# Patient Record
Sex: FEMALE | Race: WHITE | Employment: UNEMPLOYED | URBAN - METROPOLITAN AREA
[De-identification: names, ages, dates, MRNs, and addresses within clinical notes are randomized per-mention and may not be internally consistent; named-entity substitution may affect disease eponyms.]

---

## 2024-04-16 ENCOUNTER — APPOINTMENT (OUTPATIENT)
Dept: GENERAL RADIOLOGY | Age: 89
DRG: 880 | End: 2024-04-16
Payer: MEDICARE

## 2024-04-16 ENCOUNTER — HOSPITAL ENCOUNTER (INPATIENT)
Age: 89
LOS: 1 days | Discharge: NURSING, REHAB OR LONG-TERM CARE | DRG: 880 | End: 2024-04-18
Attending: EMERGENCY MEDICINE | Admitting: STUDENT IN AN ORGANIZED HEALTH CARE EDUCATION/TRAINING PROGRAM
Payer: MEDICARE

## 2024-04-16 DIAGNOSIS — R06.02 SHORTNESS OF BREATH: Primary | ICD-10-CM

## 2024-04-16 PROBLEM — R07.9 CHEST PAIN: Status: ACTIVE | Noted: 2024-04-16

## 2024-04-16 LAB
ALBUMIN SERPL-MCNC: 4 G/DL (ref 3.4–5)
ALBUMIN/GLOB SERPL: 1.7 {RATIO} (ref 1.1–2.2)
ALP SERPL-CCNC: 79 U/L (ref 40–129)
ALT SERPL-CCNC: <5 U/L (ref 10–40)
ANION GAP SERPL CALCULATED.3IONS-SCNC: 9 MMOL/L (ref 3–16)
AST SERPL-CCNC: 15 U/L (ref 15–37)
BACTERIA URNS QL MICRO: ABNORMAL /HPF
BASOPHILS # BLD: 0 K/UL (ref 0–0.2)
BASOPHILS NFR BLD: 0.5 %
BILIRUB SERPL-MCNC: 1.3 MG/DL (ref 0–1)
BILIRUB UR QL STRIP.AUTO: NEGATIVE
BUN SERPL-MCNC: 11 MG/DL (ref 7–20)
CALCIUM SERPL-MCNC: 9.5 MG/DL (ref 8.3–10.6)
CHLORIDE SERPL-SCNC: 104 MMOL/L (ref 99–110)
CLARITY UR: CLEAR
CO2 SERPL-SCNC: 28 MMOL/L (ref 21–32)
COLOR UR: YELLOW
CREAT SERPL-MCNC: <0.5 MG/DL (ref 0.6–1.2)
D DIMER: <0.27 UG/ML FEU (ref 0–0.6)
DEPRECATED RDW RBC AUTO: 12.7 % (ref 12.4–15.4)
EKG ATRIAL RATE: 69 BPM
EKG DIAGNOSIS: NORMAL
EKG P AXIS: 27 DEGREES
EKG P-R INTERVAL: 188 MS
EKG Q-T INTERVAL: 408 MS
EKG QRS DURATION: 72 MS
EKG QTC CALCULATION (BAZETT): 437 MS
EKG R AXIS: -30 DEGREES
EKG T AXIS: 8 DEGREES
EKG VENTRICULAR RATE: 69 BPM
EOSINOPHIL # BLD: 0.3 K/UL (ref 0–0.6)
EOSINOPHIL NFR BLD: 3 %
GFR SERPLBLD CREATININE-BSD FMLA CKD-EPI: 90 ML/MIN/{1.73_M2}
GLUCOSE SERPL-MCNC: 110 MG/DL (ref 70–99)
GLUCOSE UR STRIP.AUTO-MCNC: NEGATIVE MG/DL
HCT VFR BLD AUTO: 36.5 % (ref 36–48)
HGB BLD-MCNC: 12.5 G/DL (ref 12–16)
HGB UR QL STRIP.AUTO: ABNORMAL
KETONES UR STRIP.AUTO-MCNC: NEGATIVE MG/DL
LEUKOCYTE ESTERASE UR QL STRIP.AUTO: ABNORMAL
LYMPHOCYTES # BLD: 1.4 K/UL (ref 1–5.1)
LYMPHOCYTES NFR BLD: 13.4 %
MCH RBC QN AUTO: 32 PG (ref 26–34)
MCHC RBC AUTO-ENTMCNC: 34.3 G/DL (ref 31–36)
MCV RBC AUTO: 93.4 FL (ref 80–100)
MONOCYTES # BLD: 0.6 K/UL (ref 0–1.3)
MONOCYTES NFR BLD: 5.9 %
NEUTROPHILS # BLD: 8 K/UL (ref 1.7–7.7)
NEUTROPHILS NFR BLD: 77.2 %
NITRITE UR QL STRIP.AUTO: POSITIVE
NT-PROBNP SERPL-MCNC: 180 PG/ML (ref 0–449)
PH UR STRIP.AUTO: 6.5 [PH] (ref 5–8)
PLATELET # BLD AUTO: 177 K/UL (ref 135–450)
PMV BLD AUTO: 9.8 FL (ref 5–10.5)
POTASSIUM SERPL-SCNC: 4.7 MMOL/L (ref 3.5–5.1)
PROT SERPL-MCNC: 6.3 G/DL (ref 6.4–8.2)
PROT UR STRIP.AUTO-MCNC: NEGATIVE MG/DL
RBC # BLD AUTO: 3.91 M/UL (ref 4–5.2)
RBC #/AREA URNS HPF: ABNORMAL /HPF (ref 0–4)
SODIUM SERPL-SCNC: 141 MMOL/L (ref 136–145)
SP GR UR STRIP.AUTO: 1.01 (ref 1–1.03)
TROPONIN, HIGH SENSITIVITY: 16 NG/L (ref 0–14)
TROPONIN, HIGH SENSITIVITY: 17 NG/L (ref 0–14)
TROPONIN, HIGH SENSITIVITY: 17 NG/L (ref 0–14)
TROPONIN, HIGH SENSITIVITY: 18 NG/L (ref 0–14)
UA COMPLETE W REFLEX CULTURE PNL UR: YES
UA DIPSTICK W REFLEX MICRO PNL UR: YES
URN SPEC COLLECT METH UR: ABNORMAL
UROBILINOGEN UR STRIP-ACNC: 0.2 E.U./DL
WBC # BLD AUTO: 10.3 K/UL (ref 4–11)
WBC #/AREA URNS HPF: ABNORMAL /HPF (ref 0–5)

## 2024-04-16 PROCEDURE — G0378 HOSPITAL OBSERVATION PER HR: HCPCS

## 2024-04-16 PROCEDURE — 83880 ASSAY OF NATRIURETIC PEPTIDE: CPT

## 2024-04-16 PROCEDURE — 6360000002 HC RX W HCPCS

## 2024-04-16 PROCEDURE — 85025 COMPLETE CBC W/AUTO DIFF WBC: CPT

## 2024-04-16 PROCEDURE — 96375 TX/PRO/DX INJ NEW DRUG ADDON: CPT

## 2024-04-16 PROCEDURE — 87186 SC STD MICRODIL/AGAR DIL: CPT

## 2024-04-16 PROCEDURE — 81001 URINALYSIS AUTO W/SCOPE: CPT

## 2024-04-16 PROCEDURE — 93010 ELECTROCARDIOGRAM REPORT: CPT | Performed by: INTERNAL MEDICINE

## 2024-04-16 PROCEDURE — 85379 FIBRIN DEGRADATION QUANT: CPT

## 2024-04-16 PROCEDURE — 84484 ASSAY OF TROPONIN QUANT: CPT

## 2024-04-16 PROCEDURE — 71045 X-RAY EXAM CHEST 1 VIEW: CPT

## 2024-04-16 PROCEDURE — 6370000000 HC RX 637 (ALT 250 FOR IP): Performed by: INTERNAL MEDICINE

## 2024-04-16 PROCEDURE — 80053 COMPREHEN METABOLIC PANEL: CPT

## 2024-04-16 PROCEDURE — 2580000003 HC RX 258: Performed by: INTERNAL MEDICINE

## 2024-04-16 PROCEDURE — 99285 EMERGENCY DEPT VISIT HI MDM: CPT

## 2024-04-16 PROCEDURE — 96372 THER/PROPH/DIAG INJ SC/IM: CPT

## 2024-04-16 PROCEDURE — 6360000002 HC RX W HCPCS: Performed by: EMERGENCY MEDICINE

## 2024-04-16 PROCEDURE — 6360000002 HC RX W HCPCS: Performed by: INTERNAL MEDICINE

## 2024-04-16 PROCEDURE — 87077 CULTURE AEROBIC IDENTIFY: CPT

## 2024-04-16 PROCEDURE — 96374 THER/PROPH/DIAG INJ IV PUSH: CPT

## 2024-04-16 PROCEDURE — 93005 ELECTROCARDIOGRAM TRACING: CPT | Performed by: EMERGENCY MEDICINE

## 2024-04-16 PROCEDURE — 6370000000 HC RX 637 (ALT 250 FOR IP): Performed by: EMERGENCY MEDICINE

## 2024-04-16 PROCEDURE — 36415 COLL VENOUS BLD VENIPUNCTURE: CPT

## 2024-04-16 PROCEDURE — 87086 URINE CULTURE/COLONY COUNT: CPT

## 2024-04-16 RX ORDER — MECLIZINE HYDROCHLORIDE 25 MG/1
25 TABLET ORAL 3 TIMES DAILY PRN
Status: DISCONTINUED | OUTPATIENT
Start: 2024-04-16 | End: 2024-04-18 | Stop reason: HOSPADM

## 2024-04-16 RX ORDER — POTASSIUM CHLORIDE 20 MEQ/1
40 TABLET, EXTENDED RELEASE ORAL PRN
Status: DISCONTINUED | OUTPATIENT
Start: 2024-04-16 | End: 2024-04-18 | Stop reason: HOSPADM

## 2024-04-16 RX ORDER — MIDODRINE HYDROCHLORIDE 10 MG/1
10 TABLET ORAL 3 TIMES DAILY
COMMUNITY

## 2024-04-16 RX ORDER — CALCIUM CARBONATE 500 MG/1
2 TABLET, CHEWABLE ORAL 3 TIMES DAILY PRN
COMMUNITY

## 2024-04-16 RX ORDER — FOLIC ACID 1 MG/1
1 TABLET ORAL DAILY
COMMUNITY
Start: 2023-08-16

## 2024-04-16 RX ORDER — ALPRAZOLAM 0.25 MG/1
0.25 TABLET ORAL 3 TIMES DAILY
Status: DISCONTINUED | OUTPATIENT
Start: 2024-04-16 | End: 2024-04-17

## 2024-04-16 RX ORDER — ACETAMINOPHEN 325 MG/1
650 TABLET ORAL EVERY 6 HOURS PRN
Status: DISCONTINUED | OUTPATIENT
Start: 2024-04-16 | End: 2024-04-18 | Stop reason: HOSPADM

## 2024-04-16 RX ORDER — ALPRAZOLAM 0.25 MG/1
0.12 TABLET ORAL 4 TIMES DAILY
COMMUNITY
Start: 2023-08-31

## 2024-04-16 RX ORDER — MIDODRINE HYDROCHLORIDE 5 MG/1
5 TABLET ORAL 3 TIMES DAILY
Status: DISCONTINUED | OUTPATIENT
Start: 2024-04-16 | End: 2024-04-18 | Stop reason: HOSPADM

## 2024-04-16 RX ORDER — HYDROXYZINE HYDROCHLORIDE 25 MG/1
25 TABLET, FILM COATED ORAL DAILY PRN
COMMUNITY

## 2024-04-16 RX ORDER — ONDANSETRON 4 MG/1
4 TABLET, FILM COATED ORAL EVERY 6 HOURS PRN
COMMUNITY

## 2024-04-16 RX ORDER — ONDANSETRON 2 MG/ML
4 INJECTION INTRAMUSCULAR; INTRAVENOUS ONCE
Status: COMPLETED | OUTPATIENT
Start: 2024-04-16 | End: 2024-04-16

## 2024-04-16 RX ORDER — SODIUM CHLORIDE 0.9 % (FLUSH) 0.9 %
5-40 SYRINGE (ML) INJECTION EVERY 12 HOURS SCHEDULED
Status: DISCONTINUED | OUTPATIENT
Start: 2024-04-16 | End: 2024-04-18 | Stop reason: HOSPADM

## 2024-04-16 RX ORDER — ATORVASTATIN CALCIUM 40 MG/1
40 TABLET, FILM COATED ORAL NIGHTLY
Status: DISCONTINUED | OUTPATIENT
Start: 2024-04-16 | End: 2024-04-16 | Stop reason: SDUPTHER

## 2024-04-16 RX ORDER — MAGNESIUM SULFATE IN WATER 40 MG/ML
2000 INJECTION, SOLUTION INTRAVENOUS PRN
Status: DISCONTINUED | OUTPATIENT
Start: 2024-04-16 | End: 2024-04-18 | Stop reason: HOSPADM

## 2024-04-16 RX ORDER — NITROGLYCERIN 0.4 MG/1
0.4 TABLET SUBLINGUAL EVERY 5 MIN PRN
Status: DISCONTINUED | OUTPATIENT
Start: 2024-04-16 | End: 2024-04-18 | Stop reason: HOSPADM

## 2024-04-16 RX ORDER — POLYETHYLENE GLYCOL 3350 17 G/17G
17 POWDER, FOR SOLUTION ORAL DAILY PRN
Status: DISCONTINUED | OUTPATIENT
Start: 2024-04-16 | End: 2024-04-18 | Stop reason: HOSPADM

## 2024-04-16 RX ORDER — ONDANSETRON 4 MG/1
4 TABLET, ORALLY DISINTEGRATING ORAL EVERY 8 HOURS PRN
Status: DISCONTINUED | OUTPATIENT
Start: 2024-04-16 | End: 2024-04-18 | Stop reason: HOSPADM

## 2024-04-16 RX ORDER — ASPIRIN 81 MG/1
324 TABLET, CHEWABLE ORAL ONCE
Status: COMPLETED | OUTPATIENT
Start: 2024-04-16 | End: 2024-04-16

## 2024-04-16 RX ORDER — AMOXICILLIN 250 MG
1 CAPSULE ORAL 2 TIMES DAILY
COMMUNITY

## 2024-04-16 RX ORDER — LEVOTHYROXINE SODIUM 0.1 MG/1
100 TABLET ORAL DAILY
COMMUNITY

## 2024-04-16 RX ORDER — ASPIRIN 81 MG/1
81 TABLET, CHEWABLE ORAL DAILY
Status: DISCONTINUED | OUTPATIENT
Start: 2024-04-17 | End: 2024-04-18 | Stop reason: HOSPADM

## 2024-04-16 RX ORDER — FUROSEMIDE 10 MG/ML
20 INJECTION INTRAMUSCULAR; INTRAVENOUS ONCE
Status: COMPLETED | OUTPATIENT
Start: 2024-04-16 | End: 2024-04-16

## 2024-04-16 RX ORDER — TRAZODONE HYDROCHLORIDE 50 MG/1
50 TABLET ORAL NIGHTLY
COMMUNITY
Start: 2024-02-23

## 2024-04-16 RX ORDER — LEVOTHYROXINE SODIUM 0.1 MG/1
100 TABLET ORAL DAILY
Status: DISCONTINUED | OUTPATIENT
Start: 2024-04-16 | End: 2024-04-18 | Stop reason: HOSPADM

## 2024-04-16 RX ORDER — ENOXAPARIN SODIUM 100 MG/ML
40 INJECTION SUBCUTANEOUS DAILY
Status: DISCONTINUED | OUTPATIENT
Start: 2024-04-16 | End: 2024-04-18 | Stop reason: HOSPADM

## 2024-04-16 RX ORDER — MECLIZINE HYDROCHLORIDE 25 MG/1
25 TABLET ORAL 3 TIMES DAILY PRN
COMMUNITY
Start: 2024-01-26

## 2024-04-16 RX ORDER — ONDANSETRON 2 MG/ML
4 INJECTION INTRAMUSCULAR; INTRAVENOUS EVERY 6 HOURS PRN
Status: DISCONTINUED | OUTPATIENT
Start: 2024-04-16 | End: 2024-04-18 | Stop reason: HOSPADM

## 2024-04-16 RX ORDER — POLYETHYLENE GLYCOL 3350 17 G/17G
17 POWDER, FOR SOLUTION ORAL DAILY
COMMUNITY

## 2024-04-16 RX ORDER — POTASSIUM CHLORIDE 7.45 MG/ML
10 INJECTION INTRAVENOUS PRN
Status: DISCONTINUED | OUTPATIENT
Start: 2024-04-16 | End: 2024-04-18 | Stop reason: HOSPADM

## 2024-04-16 RX ORDER — ROSUVASTATIN CALCIUM 20 MG/1
20 TABLET, COATED ORAL DAILY
COMMUNITY
Start: 2022-12-08

## 2024-04-16 RX ORDER — CITALOPRAM HYDROBROMIDE 10 MG/1
10 TABLET ORAL DAILY
COMMUNITY

## 2024-04-16 RX ORDER — REGADENOSON 0.08 MG/ML
0.4 INJECTION, SOLUTION INTRAVENOUS
Status: DISCONTINUED | OUTPATIENT
Start: 2024-04-16 | End: 2024-04-18 | Stop reason: HOSPADM

## 2024-04-16 RX ORDER — ROSUVASTATIN CALCIUM 20 MG/1
20 TABLET, COATED ORAL DAILY
Status: DISCONTINUED | OUTPATIENT
Start: 2024-04-16 | End: 2024-04-18 | Stop reason: HOSPADM

## 2024-04-16 RX ORDER — ALPRAZOLAM 0.5 MG/1
0.25 TABLET ORAL ONCE
Status: COMPLETED | OUTPATIENT
Start: 2024-04-16 | End: 2024-04-16

## 2024-04-16 RX ORDER — SODIUM CHLORIDE 9 MG/ML
INJECTION, SOLUTION INTRAVENOUS PRN
Status: DISCONTINUED | OUTPATIENT
Start: 2024-04-16 | End: 2024-04-18 | Stop reason: HOSPADM

## 2024-04-16 RX ORDER — MINERAL OIL/HYDROPHIL PETROLAT
OINTMENT (GRAM) TOPICAL PRN
COMMUNITY

## 2024-04-16 RX ORDER — ACETAMINOPHEN 650 MG/1
650 SUPPOSITORY RECTAL EVERY 6 HOURS PRN
Status: DISCONTINUED | OUTPATIENT
Start: 2024-04-16 | End: 2024-04-18 | Stop reason: HOSPADM

## 2024-04-16 RX ORDER — TRAZODONE HYDROCHLORIDE 100 MG/1
100 TABLET ORAL NIGHTLY
Status: DISCONTINUED | OUTPATIENT
Start: 2024-04-16 | End: 2024-04-17

## 2024-04-16 RX ORDER — SODIUM CHLORIDE 0.9 % (FLUSH) 0.9 %
5-40 SYRINGE (ML) INJECTION PRN
Status: DISCONTINUED | OUTPATIENT
Start: 2024-04-16 | End: 2024-04-18 | Stop reason: HOSPADM

## 2024-04-16 RX ORDER — DESVENLAFAXINE 25 MG/1
25 TABLET, EXTENDED RELEASE ORAL DAILY
COMMUNITY
End: 2024-04-25

## 2024-04-16 RX ORDER — ASPIRIN 81 MG/1
81 TABLET ORAL DAILY
Status: DISCONTINUED | OUTPATIENT
Start: 2024-04-17 | End: 2024-04-16 | Stop reason: SDUPTHER

## 2024-04-16 RX ADMIN — ACETAMINOPHEN 650 MG: 325 TABLET ORAL at 23:06

## 2024-04-16 RX ADMIN — LEVOTHYROXINE SODIUM 100 MCG: 0.1 TABLET ORAL at 13:23

## 2024-04-16 RX ADMIN — ALPRAZOLAM 0.25 MG: 0.25 TABLET ORAL at 23:01

## 2024-04-16 RX ADMIN — TRAZODONE HYDROCHLORIDE 100 MG: 100 TABLET ORAL at 23:01

## 2024-04-16 RX ADMIN — MIDODRINE HYDROCHLORIDE 5 MG: 5 TABLET ORAL at 13:23

## 2024-04-16 RX ADMIN — ROSUVASTATIN CALCIUM 20 MG: 20 TABLET, COATED ORAL at 13:23

## 2024-04-16 RX ADMIN — SODIUM CHLORIDE, PRESERVATIVE FREE 10 ML: 5 INJECTION INTRAVENOUS at 23:02

## 2024-04-16 RX ADMIN — CARBIDOPA AND LEVODOPA 1.5 TABLET: 25; 100 TABLET ORAL at 17:24

## 2024-04-16 RX ADMIN — FUROSEMIDE 20 MG: 10 INJECTION, SOLUTION INTRAMUSCULAR; INTRAVENOUS at 13:24

## 2024-04-16 RX ADMIN — ALPRAZOLAM 0.25 MG: 0.5 TABLET ORAL at 05:00

## 2024-04-16 RX ADMIN — SODIUM CHLORIDE, PRESERVATIVE FREE 10 ML: 5 INJECTION INTRAVENOUS at 11:20

## 2024-04-16 RX ADMIN — ENOXAPARIN SODIUM 40 MG: 100 INJECTION SUBCUTANEOUS at 13:24

## 2024-04-16 RX ADMIN — MECLIZINE HYDROCHLORIDE 25 MG: 25 TABLET ORAL at 23:06

## 2024-04-16 RX ADMIN — ONDANSETRON 4 MG: 2 INJECTION INTRAMUSCULAR; INTRAVENOUS at 05:00

## 2024-04-16 RX ADMIN — MIDODRINE HYDROCHLORIDE 5 MG: 5 TABLET ORAL at 17:24

## 2024-04-16 RX ADMIN — CARBIDOPA AND LEVODOPA 1.5 TABLET: 25; 100 TABLET ORAL at 23:01

## 2024-04-16 RX ADMIN — ALPRAZOLAM 0.25 MG: 0.25 TABLET ORAL at 13:23

## 2024-04-16 RX ADMIN — ASPIRIN 324 MG: 81 TABLET, CHEWABLE ORAL at 08:37

## 2024-04-16 RX ADMIN — CARBIDOPA AND LEVODOPA 1.5 TABLET: 25; 100 TABLET ORAL at 13:23

## 2024-04-16 ASSESSMENT — HEART SCORE: ECG: NORMAL

## 2024-04-16 ASSESSMENT — ENCOUNTER SYMPTOMS
DIARRHEA: 0
VOMITING: 0
TROUBLE SWALLOWING: 0
SORE THROAT: 0
COUGH: 0
RHINORRHEA: 0
CHEST TIGHTNESS: 1
VOICE CHANGE: 0
ABDOMINAL PAIN: 0
NAUSEA: 0
EYES NEGATIVE: 1
GASTROINTESTINAL NEGATIVE: 1
CONSTIPATION: 0
SHORTNESS OF BREATH: 1
WHEEZING: 0

## 2024-04-16 ASSESSMENT — PAIN SCALES - GENERAL
PAINLEVEL_OUTOF10: 0
PAINLEVEL_OUTOF10: 3

## 2024-04-16 ASSESSMENT — PAIN DESCRIPTION - PAIN TYPE: TYPE: ACUTE PAIN

## 2024-04-16 ASSESSMENT — PAIN DESCRIPTION - LOCATION: LOCATION: THROAT

## 2024-04-16 ASSESSMENT — PAIN DESCRIPTION - FREQUENCY: FREQUENCY: CONTINUOUS

## 2024-04-16 ASSESSMENT — PAIN - FUNCTIONAL ASSESSMENT
PAIN_FUNCTIONAL_ASSESSMENT: PREVENTS OR INTERFERES SOME ACTIVE ACTIVITIES AND ADLS
PAIN_FUNCTIONAL_ASSESSMENT: NONE - DENIES PAIN

## 2024-04-16 ASSESSMENT — PAIN DESCRIPTION - DESCRIPTORS: DESCRIPTORS: SORE

## 2024-04-16 ASSESSMENT — PAIN DESCRIPTION - ONSET: ONSET: ON-GOING

## 2024-04-16 NOTE — ED NOTES
Report called to Zay at [facility] patient transferring to room #6316, [facility] eta 30 min.  Patient updated on transfer, depends changed patient requesting wick for transfer.

## 2024-04-16 NOTE — PROGRESS NOTES
Spoke to staff nurse Connie at [facility] in regards to pt medications, nurse stated she would fax over pt med list.  Awaiting fax. Electronically signed by Rosa Acevedo RN on 4/16/2024 at 5:41 PM

## 2024-04-16 NOTE — H&P
Internal Medicine  PGY 2  History & Physical      CC : Shortness of breath    History Obtained From:  patient    HISTORY OF PRESENT ILLNESS:    Mrs. Givens is a 88-year-old female with past medical history of anxiety, CAD, depression, essential tremor, hypothyroidism, and Parkinson's who presented to the emergency department today due to shortness of breath.  She states she woke up this morning around 2 AM from sleeping and felt short of breath.  Around 3 AM she decided to call EMS because she was still short of breath.  She states that at home about 80% of the time she wears 2 L of oxygen.  She cannot give me a reason of why she wears oxygen other than it makes her feel better.  She states that she has had increased swelling in both of her legs over the past month.  She states that she does not feel any better since this morning.  She denies any fever, chills, nausea, vomiting, abdominal pain, diarrhea, or chest pain.    In the emergency department her EKG showed normal sinus rhythm with no signs of ischemia.  Chest x-ray showed no evidence of CHF or pneumonia.  CBC and BMP were within normal limits.  High-sensitivity troponin slightly elevated but stable on repeat.  BMP was normal.  D-dimer is negative she was not tachycardic.  She was given a dose of 0.25 Xanax in the emergency department and felt better.    Past Medical History:        Diagnosis Date    CAD (coronary artery disease)     Cancer (HCC) skin    Hyperlipidemia     Parkinson's disease (HCC)     Thyroid disease        Past Surgical History:        Procedure Laterality Date    COLONOSCOPY  8/11    HYSTERECTOMY (CERVIX STATUS UNKNOWN)      SKIN CANCER EXCISION      TONSILLECTOMY         Medications Priorto Admission:    Medications Prior to Admission: carbidopa-levodopa (SINEMET)  MG per tablet, 1.5 tabs  every 3 hours, 5 x/day (8a, 11a, 2p, 5p, 8p).  folic acid (FOLVITE) 1 MG tablet, Take 1 tablet by mouth daily  meclizine (ANTIVERT) 25 MG tablet,

## 2024-04-16 NOTE — ED PROVIDER NOTES
[facility]  EMERGENCY DEPT VISIT      Patient Identification  Helen Givens is a 88 y.o. female.    Chief Complaint   Shortness of Breath      History of Present Illness:    History was obtained from patient.  Limitations to history: none  This is a  88 y.o. female who presents via ambulance to the ED with complaints of shortness of breath that woke her from sleep. Patient wears oxygen nasal canula but is not able to tell me why she need oxygen. She states she was having some palpitations when she woke up with the shortness of breath.  She denies any chest pain. Very faint tightness.  She has done this frequently in the past and typically has improvement if she takes her Xanax. This time shortness of breath was more severe than usual.  She states that currently she is taking her Xanax 4 times daily and they wake her up at 5 AM to give her meds.  She denies any fever.  No sinus congestion, sore throat, cough.  She does report leg swelling for the last 3 months.  No history of congestive heart failure but states that she sits a lot.  No history of DVT or PE.  Patient is also complaining of dizziness.  She has had this off and on in the past and she takes an uncertain medication for it.  The spinning is no different from what she has had previously.  She does feel nauseated but has had no vomiting.  Patient states that she is currently residing in a nursing facility and that she was told her vitals look good despite her shortness of breath.  Per report from the nursing home the patient called 911 herself..     Past Medical History:   Diagnosis Date    CAD (coronary artery disease)     Cancer (HCC) skin    Hyperlipidemia     Parkinson's disease (HCC)     Thyroid disease        Past Surgical History:   Procedure Laterality Date    COLONOSCOPY  8/11    HYSTERECTOMY (CERVIX STATUS UNKNOWN)      SKIN CANCER EXCISION      TONSILLECTOMY           Current Facility-Administered Medications:     aspirin chewable tablet 324 mg, 324  177 135 - 450 K/uL    MPV 9.8 5.0 - 10.5 fL    Neutrophils % 77.2 %    Lymphocytes % 13.4 %    Monocytes % 5.9 %    Eosinophils % 3.0 %    Basophils % 0.5 %    Neutrophils Absolute 8.0 (H) 1.7 - 7.7 K/uL    Lymphocytes Absolute 1.4 1.0 - 5.1 K/uL    Monocytes Absolute 0.6 0.0 - 1.3 K/uL    Eosinophils Absolute 0.3 0.0 - 0.6 K/uL    Basophils Absolute 0.0 0.0 - 0.2 K/uL   CMP w/ Reflex to MG   Result Value Ref Range    Sodium 141 136 - 145 mmol/L    Potassium reflex Magnesium 4.7 3.5 - 5.1 mmol/L    Chloride 104 99 - 110 mmol/L    CO2 28 21 - 32 mmol/L    Anion Gap 9 3 - 16    Glucose 110 (H) 70 - 99 mg/dL    BUN 11 7 - 20 mg/dL    Creatinine <0.5 (L) 0.6 - 1.2 mg/dL    Est, Glom Filt Rate 90 >60    Calcium 9.5 8.3 - 10.6 mg/dL    Total Protein 6.3 (L) 6.4 - 8.2 g/dL    Albumin 4.0 3.4 - 5.0 g/dL    Albumin/Globulin Ratio 1.7 1.1 - 2.2    Total Bilirubin 1.3 (H) 0.0 - 1.0 mg/dL    Alkaline Phosphatase 79 40 - 129 U/L    ALT <5 (L) 10 - 40 U/L    AST 15 15 - 37 U/L   D-Dimer, Quantitative   Result Value Ref Range    D-Dimer, Quant <0.27 0.00 - 0.60 ug/mL FEU   BNP   Result Value Ref Range    Pro- 0 - 449 pg/mL   Troponin   Result Value Ref Range    Troponin, High Sensitivity 17 (H) 0 - 14 ng/L   Troponin   Result Value Ref Range    Troponin, High Sensitivity 18 (H) 0 - 14 ng/L   EKG 12 Lead   Result Value Ref Range    Ventricular Rate 69 BPM    Atrial Rate 69 BPM    P-R Interval 188 ms    QRS Duration 72 ms    Q-T Interval 408 ms    QTc Calculation (Bazett) 437 ms    P Axis 27 degrees    R Axis -30 degrees    T Axis 8 degrees    Diagnosis Normal sinus rhythmLeft axis deviationAbnormal ECG        Treatment in the department:  Patient received the following while in the ED.    Medications   aspirin chewable tablet 324 mg (has no administration in time range)   ALPRAZolam (XANAX) tablet 0.25 mg (0.25 mg Oral Given 4/16/24 0500)   ondansetron (ZOFRAN) injection 4 mg (4 mg IntraVENous Given 4/16/24 0500)

## 2024-04-16 NOTE — ED TRIAGE NOTES
Pt to ED from SNF via EMS for shortness of breath for the past hour. Pt wears 3L O2 at baseline. Pt awake and alert.

## 2024-04-16 NOTE — PROGRESS NOTES
Pt arrived to room 6319, AO x4 VSS. Pt on 3L of O2 at baseline. Pt in bed, alarm on locked and low. Call light within reach.  Electronically signed by Rosa Acevedo RN on 4/16/2024 at 10:40 AM

## 2024-04-16 NOTE — PROGRESS NOTES
4 Eyes Skin Assessment     NAME:  Helen Givens  YOB: 1935  MEDICAL RECORD NUMBER:  [id number]    The patient is being assessed for  Admission    I agree that at least one RN has performed a thorough Head to Toe Skin Assessment on the patient. ALL assessment sites listed below have been assessed.      Areas assessed by both nurses:    Head, Face, Ears, Shoulders, Back, Chest, Arms, Elbows, Hands, Sacrum. Buttock, Coccyx, Ischium, Legs. Feet and Heels, and Under Medical Devices         Does the Patient have a Wound? No noted wound(s)       Dk Prevention initiated by RN: No  Wound Care Orders initiated by RN: No    Pressure Injury (Stage 3,4, Unstageable, DTI, NWPT, and Complex wounds) if present, place Wound referral order by RN under : No    New Ostomies, if present place, Ostomy referral order under : No     Nurse 1 eSignature: Electronically signed by Rosa Acevedo RN on 4/16/24 at 10:41 AM EDT    **SHARE this note so that the co-signing nurse can place an eSignature**    Nurse 2 eSignature: Electronically signed by Bernardo Guidry RN on 4/16/24 at 10:52 AM EDT

## 2024-04-17 PROBLEM — F41.9 ANXIETY: Status: ACTIVE | Noted: 2024-04-17

## 2024-04-17 PROBLEM — F33.1 MODERATE EPISODE OF RECURRENT MAJOR DEPRESSIVE DISORDER (HCC): Status: ACTIVE | Noted: 2024-04-17

## 2024-04-17 LAB
25(OH)D3 SERPL-MCNC: 18.2 NG/ML
ANION GAP SERPL CALCULATED.3IONS-SCNC: 10 MMOL/L (ref 3–16)
BUN SERPL-MCNC: 12 MG/DL (ref 7–20)
CALCIUM SERPL-MCNC: 9.1 MG/DL (ref 8.3–10.6)
CHLORIDE SERPL-SCNC: 97 MMOL/L (ref 99–110)
CO2 SERPL-SCNC: 28 MMOL/L (ref 21–32)
CREAT SERPL-MCNC: 0.8 MG/DL (ref 0.6–1.2)
DEPRECATED RDW RBC AUTO: 12.6 % (ref 12.4–15.4)
EKG ATRIAL RATE: 62 BPM
EKG DIAGNOSIS: NORMAL
EKG P AXIS: 29 DEGREES
EKG P-R INTERVAL: 188 MS
EKG Q-T INTERVAL: 432 MS
EKG QRS DURATION: 82 MS
EKG QTC CALCULATION (BAZETT): 438 MS
EKG R AXIS: -22 DEGREES
EKG T AXIS: 15 DEGREES
EKG VENTRICULAR RATE: 62 BPM
FOLATE SERPL-MCNC: >20 NG/ML (ref 4.78–24.2)
GFR SERPLBLD CREATININE-BSD FMLA CKD-EPI: 71 ML/MIN/{1.73_M2}
GLUCOSE SERPL-MCNC: 90 MG/DL (ref 70–99)
HCT VFR BLD AUTO: 38.9 % (ref 36–48)
HGB BLD-MCNC: 12.8 G/DL (ref 12–16)
MCH RBC QN AUTO: 31 PG (ref 26–34)
MCHC RBC AUTO-ENTMCNC: 32.9 G/DL (ref 31–36)
MCV RBC AUTO: 94.3 FL (ref 80–100)
PLATELET # BLD AUTO: 141 K/UL (ref 135–450)
PMV BLD AUTO: 9.7 FL (ref 5–10.5)
POTASSIUM SERPL-SCNC: 3.9 MMOL/L (ref 3.5–5.1)
RBC # BLD AUTO: 4.13 M/UL (ref 4–5.2)
SODIUM SERPL-SCNC: 135 MMOL/L (ref 136–145)
TSH SERPL DL<=0.005 MIU/L-ACNC: 4.79 UIU/ML (ref 0.27–4.2)
VIT B12 SERPL-MCNC: 877 PG/ML (ref 211–911)
WBC # BLD AUTO: 11 K/UL (ref 4–11)

## 2024-04-17 PROCEDURE — 6370000000 HC RX 637 (ALT 250 FOR IP): Performed by: NURSE PRACTITIONER

## 2024-04-17 PROCEDURE — 6370000000 HC RX 637 (ALT 250 FOR IP): Performed by: STUDENT IN AN ORGANIZED HEALTH CARE EDUCATION/TRAINING PROGRAM

## 2024-04-17 PROCEDURE — 82746 ASSAY OF FOLIC ACID SERUM: CPT

## 2024-04-17 PROCEDURE — 93005 ELECTROCARDIOGRAM TRACING: CPT

## 2024-04-17 PROCEDURE — 6370000000 HC RX 637 (ALT 250 FOR IP): Performed by: INTERNAL MEDICINE

## 2024-04-17 PROCEDURE — 85027 COMPLETE CBC AUTOMATED: CPT

## 2024-04-17 PROCEDURE — G0378 HOSPITAL OBSERVATION PER HR: HCPCS

## 2024-04-17 PROCEDURE — 2580000003 HC RX 258

## 2024-04-17 PROCEDURE — 96375 TX/PRO/DX INJ NEW DRUG ADDON: CPT

## 2024-04-17 PROCEDURE — 6360000002 HC RX W HCPCS

## 2024-04-17 PROCEDURE — 93010 ELECTROCARDIOGRAM REPORT: CPT | Performed by: INTERNAL MEDICINE

## 2024-04-17 PROCEDURE — 82306 VITAMIN D 25 HYDROXY: CPT

## 2024-04-17 PROCEDURE — 84439 ASSAY OF FREE THYROXINE: CPT

## 2024-04-17 PROCEDURE — 99222 1ST HOSP IP/OBS MODERATE 55: CPT | Performed by: NURSE PRACTITIONER

## 2024-04-17 PROCEDURE — 6360000002 HC RX W HCPCS: Performed by: INTERNAL MEDICINE

## 2024-04-17 PROCEDURE — 36415 COLL VENOUS BLD VENIPUNCTURE: CPT

## 2024-04-17 PROCEDURE — 99223 1ST HOSP IP/OBS HIGH 75: CPT | Performed by: NURSE PRACTITIONER

## 2024-04-17 PROCEDURE — 80048 BASIC METABOLIC PNL TOTAL CA: CPT

## 2024-04-17 PROCEDURE — 82607 VITAMIN B-12: CPT

## 2024-04-17 PROCEDURE — 2580000003 HC RX 258: Performed by: INTERNAL MEDICINE

## 2024-04-17 PROCEDURE — 96372 THER/PROPH/DIAG INJ SC/IM: CPT

## 2024-04-17 PROCEDURE — 84443 ASSAY THYROID STIM HORMONE: CPT

## 2024-04-17 RX ORDER — VENLAFAXINE HYDROCHLORIDE 37.5 MG/1
37.5 CAPSULE, EXTENDED RELEASE ORAL DAILY
Status: DISCONTINUED | OUTPATIENT
Start: 2024-04-17 | End: 2024-04-18 | Stop reason: HOSPADM

## 2024-04-17 RX ORDER — CITALOPRAM HYDROBROMIDE 10 MG/1
10 TABLET ORAL DAILY
Status: DISCONTINUED | OUTPATIENT
Start: 2024-04-17 | End: 2024-04-18 | Stop reason: HOSPADM

## 2024-04-17 RX ORDER — CARBIDOPA AND LEVODOPA 25; 100 MG/1; MG/1
2 TABLET, EXTENDED RELEASE ORAL NIGHTLY
Status: DISCONTINUED | OUTPATIENT
Start: 2024-04-17 | End: 2024-04-18 | Stop reason: HOSPADM

## 2024-04-17 RX ORDER — CLONAZEPAM 0.5 MG/1
0.25 TABLET ORAL EVERY 12 HOURS
Status: DISCONTINUED | OUTPATIENT
Start: 2024-04-17 | End: 2024-04-18 | Stop reason: HOSPADM

## 2024-04-17 RX ORDER — POLYVINYL ALCOHOL 14 MG/ML
1 SOLUTION/ DROPS OPHTHALMIC PRN
Status: DISCONTINUED | OUTPATIENT
Start: 2024-04-17 | End: 2024-04-18 | Stop reason: HOSPADM

## 2024-04-17 RX ORDER — ALPRAZOLAM 0.25 MG/1
0.12 TABLET ORAL 4 TIMES DAILY
Status: DISCONTINUED | OUTPATIENT
Start: 2024-04-17 | End: 2024-04-17

## 2024-04-17 RX ORDER — CARBIDOPA AND LEVODOPA 25; 100 MG/1; MG/1
2 TABLET, EXTENDED RELEASE ORAL NIGHTLY
COMMUNITY

## 2024-04-17 RX ORDER — TRAZODONE HYDROCHLORIDE 50 MG/1
50 TABLET ORAL NIGHTLY
Status: DISCONTINUED | OUTPATIENT
Start: 2024-04-17 | End: 2024-04-18 | Stop reason: HOSPADM

## 2024-04-17 RX ADMIN — WATER 1000 MG: 1 INJECTION INTRAMUSCULAR; INTRAVENOUS; SUBCUTANEOUS at 12:09

## 2024-04-17 RX ADMIN — ALPRAZOLAM 0.12 MG: 0.25 TABLET ORAL at 09:17

## 2024-04-17 RX ADMIN — TRAZODONE HYDROCHLORIDE 50 MG: 50 TABLET ORAL at 21:24

## 2024-04-17 RX ADMIN — VENLAFAXINE HYDROCHLORIDE 37.5 MG: 37.5 CAPSULE, EXTENDED RELEASE ORAL at 09:24

## 2024-04-17 RX ADMIN — SODIUM CHLORIDE, PRESERVATIVE FREE 10 ML: 5 INJECTION INTRAVENOUS at 21:35

## 2024-04-17 RX ADMIN — CARBIDOPA AND LEVODOPA 1.5 TABLET: 25; 100 TABLET ORAL at 09:17

## 2024-04-17 RX ADMIN — ENOXAPARIN SODIUM 40 MG: 100 INJECTION SUBCUTANEOUS at 09:26

## 2024-04-17 RX ADMIN — CITALOPRAM HYDROBROMIDE 10 MG: 10 TABLET ORAL at 09:18

## 2024-04-17 RX ADMIN — CARBIDOPA AND LEVODOPA 1.5 TABLET: 25; 100 TABLET ORAL at 17:45

## 2024-04-17 RX ADMIN — LEVOTHYROXINE SODIUM 100 MCG: 0.1 TABLET ORAL at 09:18

## 2024-04-17 RX ADMIN — CARBIDOPA AND LEVODOPA 1.5 TABLET: 25; 100 TABLET ORAL at 12:06

## 2024-04-17 RX ADMIN — ROSUVASTATIN CALCIUM 20 MG: 20 TABLET, COATED ORAL at 09:18

## 2024-04-17 RX ADMIN — SODIUM CHLORIDE, PRESERVATIVE FREE 10 ML: 5 INJECTION INTRAVENOUS at 09:18

## 2024-04-17 RX ADMIN — ALPRAZOLAM 0.12 MG: 0.25 TABLET ORAL at 12:06

## 2024-04-17 RX ADMIN — CLONAZEPAM 0.25 MG: 0.5 TABLET ORAL at 18:41

## 2024-04-17 RX ADMIN — MIDODRINE HYDROCHLORIDE 5 MG: 5 TABLET ORAL at 09:24

## 2024-04-17 RX ADMIN — ASPIRIN 81 MG: 81 TABLET, CHEWABLE ORAL at 09:18

## 2024-04-17 RX ADMIN — MIDODRINE HYDROCHLORIDE 5 MG: 5 TABLET ORAL at 12:06

## 2024-04-17 RX ADMIN — CARBIDOPA AND LEVODOPA 1.5 TABLET: 25; 100 TABLET ORAL at 21:24

## 2024-04-17 ASSESSMENT — ENCOUNTER SYMPTOMS
GASTROINTESTINAL NEGATIVE: 1
SHORTNESS OF BREATH: 1

## 2024-04-17 NOTE — PLAN OF CARE
D: very anxious last pm. Was requesting for staff to hold her for comfort and to stay at bedside stating \"Don't leave me. Get someone to hold me.\" Allowed pt to verbalize concerns. Emotional support and reassurance given. C/o sore throat and some chest discomfort, tremors, and dizziness, but after receiving Trazodone 100mg, Xanax .25mg, Antivert, and Tylenol pt slept. Aroused easily and denied pain see pain assessment . Trazodone and Xanax dose while in hospital higher than what she was receiving at [facility]. Purulent pimple/boil noted R-labia and R-pannus.   A: Cont to monitor during hourly rounds    Problem: Safety - Adult  Goal: Free from fall injury  4/17/2024 0433 by Barbie Seals, RN  Outcome: Progressing  4/17/2024 0432 by Barbie Seals, RN  Outcome: Progressing     Problem: ABCDS Injury Assessment  Goal: Absence of physical injury  4/17/2024 0433 by Barbie Seals, RN  Outcome: Progressing  4/17/2024 0432 by Barbie Seals, RN  Outcome: Progressing     Problem: Pain  Goal: Verbalizes/displays adequate comfort level or baseline comfort level  Outcome: Progressing

## 2024-04-17 NOTE — CONSULTS
[facility]  Palliative Medicine Consultation Note      Date Of Admission:4/16/2024  Date of consult: 04/17/24  Seen by PC in the past:  No    Recommendations:        Met with pt and introduced palliative care. She is A&Ox3 but could not tell me why she's in the hospital. I explained it to her. She mainly responded by asking me to move her HOB up or down. I helped reposition her for comfort. She seemed anxious with her frequent requests for slight repositioning. She states her sister-in-law Irma is her HCPOA. I called Irma and introduced palliative care. Irma confirms she is pt's HCPOA but says she doesn't have a copy. She asked that we obtain a copy from [facility], where pt has been living for about 9 months. Irma reports that the pt has had significant anxiety for many years and tends to seek out medications to help her with anxiety and sleep. She has gone to the ED many times with complaints related to anxiety and sob, and Irma says her anxiety worsens at night. She often calls family and friends in the middle of the night saying she's dying. She has hired 24/7 caregivers through [employer] to stay with her at [facility]. Irma says the pt is \"sharp as a tack\" when it comes to her finances, stocks, taxes, etc but that she can get confused when anxious in the evening and night. I informed Irma about what we are currently treating her for and the plan of care. Left message for CM to attempt to obtain HCPOA from [facility] and discussed case with Alba Hernandez psych APRN.    1. Goals of Care/Advanced Care planning/Code status: DNR/DNI (Limited- no to all interventions). Pt hopes for relief of her anxiety and to maintain her status at [facility].  2. Pain: denies pain but c/o \"discomfort\" related to positioning in the bed and likely related to anxiety as well.  3. SOB: c/o sob this morning while sating ok on 2 L oxygen, which she uses at baseline. Difficult to assess breathing due  (FOLVITE) 1 MG tablet, Take 1 tablet by mouth daily  meclizine (ANTIVERT) 25 MG tablet, Take 1 tablet by mouth 3 times daily as needed  rosuvastatin (CRESTOR) 20 MG tablet, Take 1 tablet by mouth daily  traZODone (DESYREL) 50 MG tablet, Take 1 tablet by mouth nightly Give with 11pm Alprazolam  ALPRAZolam (XANAX) 0.25 MG tablet, Take 0.5 tablets by mouth in the morning, at noon, in the evening, and at bedtime.  levothyroxine (SYNTHROID) 100 MCG tablet, Take 1 tablet by mouth Daily  calcium carbonate (TUMS) 500 MG chewable tablet, Take 2 tablets by mouth 3 times daily as needed for Heartburn  citalopram (CELEXA) 10 MG tablet, Take 1 tablet by mouth daily 10mg po daily 4/12/24-4/24/25/24, then increase to 20mg po daily starting 4/26/24  desvenlafaxine succinate (PRISTIQ) 25 MG TB24 extended release tablet, Take 1 tablet by mouth daily Plan to d/c on 4/25/24 (when Citalopram is increased)  polyethylene glycol (MIRALAX) 17 g PACK packet, Take 17 g by mouth daily Hold for loose stools  ondansetron (ZOFRAN) 4 MG tablet, Take 1 tablet by mouth every 6 hours as needed for Nausea or Vomiting  silver sulfADIAZINE (SILVADENE) 1 % cream, Apply 2 g/day topically daily Apply topically daily 2grams bi Labia folds followed by aquaphor BID & PRN  mineral oil-hydrophilic petrolatum (AQUAPHOR) ointment, Apply topically as needed for Dry Skin Apply topically BID and as needed bi Labia folds after slivadnen cream 1% 2 grams applied  Acetaminophen (TYLENOL PO), Take 650 mg by mouth every 4 hours as needed (pain)  hydrOXYzine HCl (ATARAX) 25 MG tablet, Take 1 tablet by mouth daily as needed for Anxiety  midodrine (PROAMATINE) 10 MG tablet, Take 1 tablet by mouth 3 times daily Give at 8am, 12pm, and 5pm.  Hold if SBP > 130.  senna-docusate (PERICOLACE) 8.6-50 MG per tablet, Take 1 tablet by mouth in the morning and at bedtime  aspirin 81 MG EC tablet, Take 1 tablet by mouth daily  [DISCONTINUED] Cholecalciferol (VITAMIN D) 2000 UNIT TABS,

## 2024-04-17 NOTE — CONSULTS
Psychiatry Initial Consultation    Patient Name: Helen Givens  MRN: [id number]  Admission Date: 4/16/2024    Reason for Consult: Severe anxiety/panic, largely due to advanced Parkinson's and difficulty coping     HPI:   Helen Givens is a 88 y.o. female who presented to the hospital on 04/16/2024 with a chief complaint of shortness of breath. Per ED documentation, patient presents via ambulance to the ED with complaints of shortness of breath that woke her from sleep. Patient wears oxygen nasal canula but is not able to tell me why she need oxygen. She states she was having some palpitations when she woke up with the shortness of breath.  She denies any chest pain. Very faint tightness.  She has done this frequently in the past and typically has improvement if she takes her Xanax. This time shortness of breath was more severe than usual.  She states that currently she is taking her Xanax 4 times daily and they wake her up at 5 AM to give her meds.  She denies any fever.  No sinus congestion, sore throat, cough.  She does report leg swelling for the last 3 months.  No history of congestive heart failure but states that she sits a lot.  No history of DVT or PE.  Patient is also complaining of dizziness.  She has had this off and on in the past and she takes an uncertain medication for it.  The spinning is no different from what she has had previously.  She does feel nauseated but has had no vomiting.  Patient states that she is currently residing in a nursing facility and that she was told her vitals look good despite her shortness of breath.  Per report from the nursing home the patient called 911 herself. Her urinalysis was + for nitrites and LEs.     Chart review indicates the following home psychiatric medications:  Alprazolam 0.125 mg PO QID (0500, 1100, 1700, 2300)  Sinemet 25/100 mg 1.5 tabs PO 5x/day (0800, 1100, 1400, 1700, 2000)  Trazodone 50 mg HS (2300)  Citalopram - started 4/12/24 at dose of 10mg po  ondansetron **OR** ondansetron, acetaminophen **OR** acetaminophen, polyethylene glycol, nitroGLYCERIN, regadenoson, perflutren lipid microspheres     ROS: See Medical H&PE     PE:    /60   Pulse 62   Temp 97.4 °F (36.3 °C) (Oral)   Resp 20   Ht 1.702 m (5' 7\")   Wt 84.9 kg (187 lb 2.7 oz)   SpO2 100%   BMI 29.32 kg/m²       Motor / Gait: Observed lying in bed, gait deferred     Mental Status Examination:    Appearance: WF, appears stated age, lying in bed, wearing hospital attire, fair grooming and fair hygiene  Behavior/Attitude Toward Examiner: Overall cooperative, attentive, limited eye contact  Speech: Spontaneous, normal rate, normal volume  Mood: \"Terrible\"  Affect: Flat  Thought Processes: Linear  Thought Content: Endorses wishes to die but denies active suicidal plan/intent, no HI verbalized, no delusions or obsessions voiced  Perceptions: No AVH verbalized, did not appear to be RTIS  Attention: Intact to interview  Cognition: Alert and oriented to person, place, time, and situation  Insight: Limited   Judgment: Limited     LAB: Reviewed all labs obtained during admission to date.    No results found for: \"WPBTCXSV57\"  No results found for: \"TSH\", \"G1FHBQS\", \"J2OCMHR\", \"THYROIDAB\", \"FT3\", \"T4FREE\"  No results found for: \"FOLATE\"  No results found for: \"VITD25\"   Lab Results   Component Value Date/Time    COLORU Yellow 2024 06:00 PM    NITRU POSITIVE 2024 06:00 PM    GLUCOSEU Negative 2024 06:00 PM    KETUA Negative 2024 06:00 PM    UROBILINOGEN 0.2 2024 06:00 PM    BILIRUBINUR Negative 2024 06:00 PM     EK2024 QTc 438    Dx:   Primary Psychiatric (DSM V) Diagnosis: Anxiety, unspecified  Secondary Psychiatric (DSM V) Diagnoses: MDD  Chemical Dependency Diagnoses: None     Assessment  Reviewed nursing and ancillary staff notes since arrival to hospital, reviewed previous records and records available through Care Everywhere. Evaluated medications and

## 2024-04-17 NOTE — PROGRESS NOTES
Clinical Pharmacy Progress Note  Medication History     List of of current medications patient is taking is complete. Home Medication list in EPIC updated to reflect changes noted below.    Source of information: Medication list from [facility]     Changes made to medication list:   Medications removed:   Cholecalciferol    Medications added:   Sinemet ER 25/100mg 2 tabs po qHS at 11pm    Medication doses adjusted / updated:   Alprazolam - takes 0.125mg po QID at 5am, 11am, 5pm, and 11pm  Sinemet 25/100mg 1.5 tabs po 5x/day at 8am, 11am, 2pm, 5pm, and 8pm  Midodrine - takes 10mg po TID at 8am, 12pm, 5pm.  Hold for SBP > 130.  Senna-S - takes 1 tablet BID  Trazodone - takes 50mg po qhs at 11pm with last dose of Alprazolam    Other notes:   Citalopram - started 4/12/24 at dose of 10mg po daily x2 weeks, then increase to 20mg po daily starting 4/26/24.  Desvenlafaxine - reduced to 25mg po daily on 4/12/24 with plans to STOP on 4/26/24 (when Citalopram dose is increased).  Unclear when pt had last doses of PRN meds - marked as \"unknown\" last dose times, but pt has active orders for them at NH.    Please call with questions--  Thanks--  Kathryn Murillo, AlexD, BCPS, BCGP  [id number] ([facility])   4/17/2024 8:14 AM      Current Outpatient Medications   Medication Instructions    Acetaminophen (TYLENOL PO) 650 mg, Oral, EVERY 4 HOURS PRN    ALPRAZolam (XANAX) 0.125 mg, Oral, 4 times daily    aspirin 81 mg, Oral, DAILY,      calcium carbonate (TUMS) 500 MG chewable tablet 2 tablets, Oral, 3 TIMES DAILY PRN    carbidopa-levodopa (SINEMET CR)  MG per extended release tablet 2 tablets, Oral, NIGHTLY, At 11pm    carbidopa-levodopa (SINEMET)  MG per tablet Take 1.5 tablets by mouth 5 times daily 1.5 tablets at 8am, 11am, 2pm, 5pm, and 8pm    citalopram (CELEXA) 10 mg, Oral, DAILY, 10mg po daily 4/12/24-4/24/25/24, then increase to 20mg po daily starting 4/26/24    desvenlafaxine succinate (PRISTIQ) 25 mg,

## 2024-04-18 VITALS
BODY MASS INDEX: 29.17 KG/M2 | RESPIRATION RATE: 16 BRPM | OXYGEN SATURATION: 97 % | SYSTOLIC BLOOD PRESSURE: 107 MMHG | WEIGHT: 185.85 LBS | DIASTOLIC BLOOD PRESSURE: 70 MMHG | HEIGHT: 67 IN | HEART RATE: 66 BPM | TEMPERATURE: 97.4 F

## 2024-04-18 LAB
ANION GAP SERPL CALCULATED.3IONS-SCNC: 9 MMOL/L (ref 3–16)
BACTERIA UR CULT: ABNORMAL
BUN SERPL-MCNC: 16 MG/DL (ref 7–20)
CALCIUM SERPL-MCNC: 8.8 MG/DL (ref 8.3–10.6)
CHLORIDE SERPL-SCNC: 101 MMOL/L (ref 99–110)
CO2 SERPL-SCNC: 29 MMOL/L (ref 21–32)
CREAT SERPL-MCNC: 0.6 MG/DL (ref 0.6–1.2)
DEPRECATED RDW RBC AUTO: 12.7 % (ref 12.4–15.4)
GFR SERPLBLD CREATININE-BSD FMLA CKD-EPI: 86 ML/MIN/{1.73_M2}
GLUCOSE SERPL-MCNC: 100 MG/DL (ref 70–99)
HCT VFR BLD AUTO: 38.5 % (ref 36–48)
HGB BLD-MCNC: 13 G/DL (ref 12–16)
MCH RBC QN AUTO: 31.8 PG (ref 26–34)
MCHC RBC AUTO-ENTMCNC: 33.7 G/DL (ref 31–36)
MCV RBC AUTO: 94.2 FL (ref 80–100)
ORGANISM: ABNORMAL
PLATELET # BLD AUTO: 176 K/UL (ref 135–450)
PMV BLD AUTO: 9.1 FL (ref 5–10.5)
POTASSIUM SERPL-SCNC: 4.5 MMOL/L (ref 3.5–5.1)
RBC # BLD AUTO: 4.08 M/UL (ref 4–5.2)
SODIUM SERPL-SCNC: 139 MMOL/L (ref 136–145)
T4 FREE SERPL-MCNC: 1.2 NG/DL (ref 0.9–1.8)
WBC # BLD AUTO: 8.4 K/UL (ref 4–11)

## 2024-04-18 PROCEDURE — 6370000000 HC RX 637 (ALT 250 FOR IP): Performed by: NURSE PRACTITIONER

## 2024-04-18 PROCEDURE — G0378 HOSPITAL OBSERVATION PER HR: HCPCS

## 2024-04-18 PROCEDURE — 36415 COLL VENOUS BLD VENIPUNCTURE: CPT

## 2024-04-18 PROCEDURE — 80048 BASIC METABOLIC PNL TOTAL CA: CPT

## 2024-04-18 PROCEDURE — 6370000000 HC RX 637 (ALT 250 FOR IP): Performed by: INTERNAL MEDICINE

## 2024-04-18 PROCEDURE — 93306 TTE W/DOPPLER COMPLETE: CPT

## 2024-04-18 PROCEDURE — 2700000000 HC OXYGEN THERAPY PER DAY

## 2024-04-18 PROCEDURE — 1200000000 HC SEMI PRIVATE

## 2024-04-18 PROCEDURE — 2580000003 HC RX 258

## 2024-04-18 PROCEDURE — 6370000000 HC RX 637 (ALT 250 FOR IP): Performed by: STUDENT IN AN ORGANIZED HEALTH CARE EDUCATION/TRAINING PROGRAM

## 2024-04-18 PROCEDURE — 6360000002 HC RX W HCPCS: Performed by: INTERNAL MEDICINE

## 2024-04-18 PROCEDURE — 6360000002 HC RX W HCPCS

## 2024-04-18 PROCEDURE — 96376 TX/PRO/DX INJ SAME DRUG ADON: CPT

## 2024-04-18 PROCEDURE — 85027 COMPLETE CBC AUTOMATED: CPT

## 2024-04-18 PROCEDURE — 2580000003 HC RX 258: Performed by: INTERNAL MEDICINE

## 2024-04-18 PROCEDURE — 94761 N-INVAS EAR/PLS OXIMETRY MLT: CPT

## 2024-04-18 RX ORDER — CIPROFLOXACIN 500 MG/1
500 TABLET, FILM COATED ORAL 2 TIMES DAILY
Qty: 10 TABLET | Refills: 0 | Status: SHIPPED | OUTPATIENT
Start: 2024-04-18 | End: 2024-04-23

## 2024-04-18 RX ORDER — ALPRAZOLAM 0.25 MG/1
0.12 TABLET ORAL 4 TIMES DAILY PRN
Status: DISCONTINUED | OUTPATIENT
Start: 2024-04-18 | End: 2024-04-18

## 2024-04-18 RX ADMIN — VENLAFAXINE HYDROCHLORIDE 37.5 MG: 37.5 CAPSULE, EXTENDED RELEASE ORAL at 09:51

## 2024-04-18 RX ADMIN — SODIUM CHLORIDE, PRESERVATIVE FREE 5 ML: 5 INJECTION INTRAVENOUS at 09:50

## 2024-04-18 RX ADMIN — MECLIZINE HYDROCHLORIDE 25 MG: 25 TABLET ORAL at 09:39

## 2024-04-18 RX ADMIN — CITALOPRAM HYDROBROMIDE 10 MG: 10 TABLET ORAL at 09:40

## 2024-04-18 RX ADMIN — CARBIDOPA AND LEVODOPA 1.5 TABLET: 25; 100 TABLET ORAL at 11:56

## 2024-04-18 RX ADMIN — LEVOTHYROXINE SODIUM 100 MCG: 0.1 TABLET ORAL at 06:34

## 2024-04-18 RX ADMIN — CARBIDOPA AND LEVODOPA 1.5 TABLET: 25; 100 TABLET ORAL at 09:40

## 2024-04-18 RX ADMIN — WATER 1000 MG: 1 INJECTION INTRAMUSCULAR; INTRAVENOUS; SUBCUTANEOUS at 11:58

## 2024-04-18 RX ADMIN — ENOXAPARIN SODIUM 40 MG: 100 INJECTION SUBCUTANEOUS at 09:50

## 2024-04-18 RX ADMIN — CARBIDOPA AND LEVODOPA 2 TABLET: 25; 100 TABLET, EXTENDED RELEASE ORAL at 00:23

## 2024-04-18 RX ADMIN — ASPIRIN 81 MG: 81 TABLET, CHEWABLE ORAL at 09:47

## 2024-04-18 RX ADMIN — ROSUVASTATIN CALCIUM 20 MG: 20 TABLET, COATED ORAL at 09:40

## 2024-04-18 RX ADMIN — CLONAZEPAM 0.25 MG: 0.5 TABLET ORAL at 06:34

## 2024-04-18 NOTE — PLAN OF CARE
Problem: Discharge Planning  Goal: Discharge to home or other facility with appropriate resources  Outcome: Progressing  Flowsheets (Taken 4/18/2024 0357)  Discharge to home or other facility with appropriate resources:   Identify barriers to discharge with patient and caregiver   Arrange for needed discharge resources and transportation as appropriate   Identify discharge learning needs (meds, wound care, etc)     Problem: Safety - Adult  Goal: Free from fall injury  Outcome: Progressing  Flowsheets (Taken 4/18/2024 0357)  Free From Fall Injury:   Based on caregiver fall risk screen, instruct family/caregiver to ask for assistance with transferring infant if caregiver noted to have fall risk factors   Instruct family/caregiver on patient safety  Note: All safety measures are in place.     Problem: ABCDS Injury Assessment  Goal: Absence of physical injury  Outcome: Progressing  Flowsheets (Taken 4/18/2024 0357)  Absence of Physical Injury: Implement safety measures based on patient assessment     Problem: Pain  Goal: Verbalizes/displays adequate comfort level or baseline comfort level  Outcome: Progressing  Flowsheets (Taken 4/18/2024 0357)  Verbalizes/displays adequate comfort level or baseline comfort level:   Encourage patient to monitor pain and request assistance   Assess pain using appropriate pain scale   Administer analgesics based on type and severity of pain and evaluate response   Notify Licensed Independent Practitioner if interventions unsuccessful or patient reports new pain   Implement non-pharmacological measures as appropriate and evaluate response  Note: No c/o pain. Plan of care ongoing.

## 2024-04-18 NOTE — DISCHARGE INSTR - COC
Continuity of Care Form    Patient Name: Helen Givens   :  1935  MRN:  [id number]    Admit date:  2024  Discharge date:      Code Status Order: Limited   Advance Directives:     Admitting Physician:  Ehsan Acharya MD  PCP: Goldie Meier MD    Discharging Nurse: Yaritza  Discharging Hospital Unit/Room#: 6319/6319-01  Discharging Unit Phone Number: [phone]    Emergency Contact:   Extended Emergency Contact Information  Primary Emergency Contact: Irma Zavala  Mobile Phone: [phone]  Relation: Other Relative  Secondary Emergency Contact: Jaren Zavala  Mobile Phone: [phone]  Relation: Niece/Nephew    Past Surgical History:  Past Surgical History:   Procedure Laterality Date    COLONOSCOPY      HYSTERECTOMY (CERVIX STATUS UNKNOWN)      SKIN CANCER EXCISION      TONSILLECTOMY         Immunization History:   Immunization History   Administered Date(s) Administered    COVID-19, MODERNA BLUE border, Primary or Immunocompromised, (age 12y+), IM, 100 mcg/0.5mL 2022    COVID-19, PFIZER PURPLE top, DILUTE for use, (age 12 y+), 30mcg/0.3mL 12/15/2021       Active Problems:  Patient Active Problem List   Diagnosis Code    Chest pain R07.9    Anxiety F41.9    Moderate episode of recurrent major depressive disorder (HCC) F33.1       Isolation/Infection:   Isolation            No Isolation          Patient Infection Status       None to display            Nurse Assessment:  Last Vital Signs: /70   Pulse 66   Temp 97.4 °F (36.3 °C) (Oral)   Resp 16   Ht 1.702 m (5' 7\")   Wt 84.3 kg (185 lb 13.6 oz)   SpO2 97%   BMI 29.11 kg/m²     Last documented pain score (0-10 scale): Pain Level: 0  Last Weight:   Wt Readings from Last 1 Encounters:   24 84.3 kg (185 lb 13.6 oz)     Mental Status:  oriented, alert, coherent, logical, thought processes intact, and able to concentrate and follow conversation    IV Access:  - None    Nursing Mobility/ADLs:  Walking   Assisted  Transfer   SECTION    Prognosis: Fair    Condition at Discharge: Stable    Rehab Potential (if transferring to Rehab): Fair    Recommended Labs or Other Treatments After Discharge:     Physician Certification: I certify the above information and transfer of Helen Givens  is necessary for the continuing treatment of the diagnosis listed and that she requires Intermediate Nursing Care for greater 30 days.     Update Admission H&P: No change in H&P    PHYSICIAN SIGNATURE:  Electronically signed by Ehsan Acharya MD on 4/18/24 at 12:38 PM EDT

## 2024-04-18 NOTE — PROGRESS NOTES
Pt discharged back to [facility]. Discharge plan discussed, pt agreeable to plan. Belongings gathered. IV removed. Pt picked up via transport.

## 2024-04-18 NOTE — DISCHARGE SUMMARY
INTERNAL MEDICINE DEPARTMENT AT THE [facility]  DISCHARGE SUMMARY    Patient ID: Helen Givens                                             Discharge Date: 4/18/2024   Patient's PCP: Goldie Meier MD                                          Discharge Physician: Yohan Dixon DO   Admit Date: 4/16/2024   Admitting Physician: Ehsan Acharya MD    PROBLEMS DURING HOSPITALIZATION:  Present on Admission:   Chest pain   Anxiety   Moderate episode of recurrent major depressive disorder (HCC)      DISCHARGE DIAGNOSES: Chest pain     HPI: Mrs. Givens is a 88-year-old female with past medical history of anxiety, CAD, depression, essential tremor, hypothyroidism, and Parkinson's who presented to the emergency department today due to shortness of breath.  She states she woke up this morning around 2 AM from sleeping and felt short of breath.  Around 3 AM she decided to call EMS because she was still short of breath.  She states that at home about 80% of the time she wears 2 L of oxygen.  She cannot give me a reason of why she wears oxygen other than it makes her feel better.  She states that she has had increased swelling in both of her legs over the past month.  She states that she does not feel any better since this morning.  She denies any fever, chills, nausea, vomiting, abdominal pain, diarrhea, or chest pain.     In the emergency department her EKG showed normal sinus rhythm with no signs of ischemia.  Chest x-ray showed no evidence of CHF or pneumonia.  CBC and BMP were within normal limits.  High-sensitivity troponin slightly elevated but stable on repeat.  BMP was normal.  D-dimer is negative she was not tachycardic.  She was given a dose of 0.25 Xanax in the emergency department and felt better.       The following issues were addressed during hospitalization:    Chest pain   High-sensitivity troponins were downtrending from 18->17.  proBNP 180.  D-dimer 0.27.  Recent echo on 2/22/2024 showed an EF of  52-67% and no regional wall motion abnormalities with mild aortic valve regurgitation. Believe this may have been anxiety related as patient stated she felt better after getting xanax in the emergency department.  She was initially placed on 3 L supplemental oxygen nasal cannula however patient states about 80% the time she wears 2 L at home.  She was weaned back down to her home 2 L. 2D echo was done which showed ejection fraction of 55%.  Left ventricle is normal in size with mild concentric hypertrophy.  No regional wall motion abnormalities.  Indeterminate diastolic function.      Urinary tract infection  Patient's UA showed evidence of urinary tract infection and urine culture grew E. coli greater than 100,000 colonies.  She was started on ceftriaxone and received 2 days 1 g IV.  She was transition to oral ciprofloxacin 500 mg twice daily for 5 days upon discharge.    Parkinson's disease  Continued home Sinemet 1.5 mg 5 times a day     Anxiety  Psychiatry was consulted as patient remains very anxious and has felt this way for quite some time.  They recommending switching her from Xanax to Klonopin 0.25 mg twice a day.  However patient states she did not like the Klonopin was not tolerating it well so upon discharge she was switched back to her home Xanax dose.  Continue cross titration of antidepressants as she was already doing.    Physical Exam:  Physical Exam  Constitutional:       General: She is awake.      Appearance: Normal appearance. She is normal weight.   HENT:      Head: Normocephalic and atraumatic.   Eyes:      Extraocular Movements: Extraocular movements intact.      Conjunctiva/sclera: Conjunctivae normal.      Pupils: Pupils are equal, round, and reactive to light.   Cardiovascular:      Rate and Rhythm: Normal rate and regular rhythm.      Pulses: Normal pulses.      Heart sounds: Normal heart sounds.   Pulmonary:      Effort: Pulmonary effort is normal.      Breath sounds: Normal breath

## 2024-04-18 NOTE — CARE COORDINATION
Case Management Assessment            Discharge Note                    Date / Time of Note: 4/18/2024 11:43 AM                  Discharge Note Completed by: Dimitri Vale RN    Patient Name: Helen Givens   YOB: 1935  Diagnosis: Shortness of breath [R06.02]  Chest pain [R07.9]   Date / Time: 4/16/2024  3:43 AM    Current PCP: Goldie Meier MD  Clinic patient: No    Hospitalization in the last 30 days: No       Advance Directives:  Code Status: Limited  [place] DNR form completed and on chart: Yes    Financial:  Payor: [facility] MEDICARE / Plan: [facility] MEDICARE COMPLETE / Product Type: *No Product type* /      Pharmacy:  No Pharmacies Listed    Assistance purchasing medications?: Potential Assistance Purchasing Medications: No  Assistance provided by Case Management: None at this time    Does patient want to participate in local refill/ meds to beds program?: No    Meds To Beds General Rules:  1. Can ONLY be done Monday- Friday between 8:30am-5pm  2. Prescription(s) must be in pharmacy by 3pm to be filled same day  3.Copy of patient's insurance/ prescription drug card and patient face sheet must be sent along with the prescription(s)  4. Cost of Rx cannot be added to hospital bill. If financial assistance is needed, please contact unit  or ;  or  CANNOT provide pharmacy voucher for patients co-pays  5. Patients can then  the prescription on their way out of the hospital at discharge, or pharmacy can deliver to the bedside if staff is available. (payment due at time of pick-up or delivery - cash, check, or card accepted)     Able to afford home medications/ co-pay costs: Yes    ADLS:  Current PT AM-PAC Score:   /24  Current OT AM-PAC Score:   /24    DISCHARGE Disposition: [facility] Term Care [facility] ([facility]): [facility]  Phone: [phone]  Fax: [phone]    LOC at discharge: Long Term Care  MACK Completed: Yes    Notification completed in 
Case Management Assessment  Initial Evaluation    Date/Time of Evaluation: 4/16/2024 3:31 PM  Assessment Completed by: Henna Hannah RN    If patient is discharged prior to next notation, then this note serves as note for discharge by case management.    Patient Name: Helen Givens                   YOB: 1935  Diagnosis: Shortness of breath [R06.02]  Chest pain [R07.9]                   Date / Time: 4/16/2024  3:43 AM    Patient Admission Status: Observation   Readmission Risk (Low < 19, Mod (19-27), High > 27): No data recorded  Current PCP: Goldie Meier MD  PCP verified by CM? Yes    Chart Reviewed: Yes      History Provided by: Patient  Patient Orientation: Alert and Oriented, Person, Place, Situation, Self    Patient Cognition: Alert    Hospitalization in the last 30 days (Readmission):  No    If yes, Readmission Assessment in  Navigator will be completed.    Advance Directives:      Code Status: Limited   Patient's Primary Decision Maker is: Legal Next of Kin      Discharge Planning:    Patient lives with: Other (Comment) (LTC MPL) Type of Home: Long-Term Care  Primary Care Giver: Private caregiver  Patient Support Systems include: Family Members   Current Financial resources: Medicare  Current community resources: None  Current services prior to admission: Durable Medical Equipment, Extended Care Facility            Current DME: Walker            Type of Home Care services:  Aide Services    ADLS  Prior functional level: Assistance with the following:, Bathing, Dressing, Toileting, Feeding, Cooking, Housework, Shopping, Mobility  Current functional level: Assistance with the following:, Bathing, Dressing, Toileting, Feeding, Cooking, Housework, Shopping, Mobility    PT AM-PAC:   /24  OT AM-PAC:   /24    Family can provide assistance at DC: No  Would you like Case Management to discuss the discharge plan with any other family members/significant others, and if so, who? No  Plans to Return to 
cramping

## 2024-04-18 NOTE — PROGRESS NOTES
Plan:  and Helen pt explored spiritual supports to pt, the pt doesn't wish to have parish contacted at this time. No further visits planned at this time.  Silvia Ochoa Mdiv., [facility]     04/18/24 9902   Encounter Summary   Encounter Overview/Reason  Follow-up   Service Provided For: Patient   Begin Time 0926   End Time  0928   Total Time Calculated 2 min   Spiritual/Emotional needs   Type Spiritual Support   Assessment/Intervention/Outcome   Assessment Concerns with suffering   Intervention Active listening;Discussed belief system/Restoration practices/christie   Outcome Connection/Belonging

## 2024-04-21 LAB
EKG ATRIAL RATE: 78 BPM
EKG DIAGNOSIS: NORMAL
EKG P AXIS: 4 DEGREES
EKG P-R INTERVAL: 146 MS
EKG Q-T INTERVAL: 406 MS
EKG QRS DURATION: 84 MS
EKG QTC CALCULATION (BAZETT): 456 MS
EKG R AXIS: -33 DEGREES
EKG T AXIS: -11 DEGREES
EKG VENTRICULAR RATE: 76 BPM

## 2024-08-16 NOTE — DISCHARGE INSTRUCTIONS
-Continue taking your xanax 0.125mg QID  -Continue taking citalopram 10 mg until 4/26/2024 and then start taking citalopram 20 mg daily.  -Continue taking desvenlafaxine 25 mg daily until 4/26/2024 and then stop taking desvenlafaxine  -Follow-up with your primary care physician 1 week   DISPLAY PLAN FREE TEXT